# Patient Record
Sex: MALE | Race: WHITE | HISPANIC OR LATINO | URBAN - METROPOLITAN AREA
[De-identification: names, ages, dates, MRNs, and addresses within clinical notes are randomized per-mention and may not be internally consistent; named-entity substitution may affect disease eponyms.]

---

## 2017-07-19 ENCOUNTER — EMERGENCY (EMERGENCY)
Facility: HOSPITAL | Age: 42
LOS: 0 days | Discharge: HOME | End: 2017-07-19
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
## 2017-07-19 DIAGNOSIS — S52.571A OTHER INTRAARTICULAR FRACTURE OF LOWER END OF RIGHT RADIUS, INITIAL ENCOUNTER FOR CLOSED FRACTURE: ICD-10-CM

## 2017-07-19 DIAGNOSIS — M25.531 PAIN IN RIGHT WRIST: ICD-10-CM
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
## 2017-07-19 DIAGNOSIS — Z87.891 PERSONAL HISTORY OF NICOTINE DEPENDENCE: ICD-10-CM

## 2018-06-05 ENCOUNTER — EMERGENCY (EMERGENCY)
Facility: HOSPITAL | Age: 43
LOS: 0 days | Discharge: HOME | End: 2018-06-05
Admitting: PHYSICIAN ASSISTANT

## 2018-06-05 VITALS
SYSTOLIC BLOOD PRESSURE: 129 MMHG | OXYGEN SATURATION: 99 % | TEMPERATURE: 98 F | DIASTOLIC BLOOD PRESSURE: 78 MMHG | HEART RATE: 82 BPM | RESPIRATION RATE: 18 BRPM

## 2018-06-05 VITALS
SYSTOLIC BLOOD PRESSURE: 130 MMHG | DIASTOLIC BLOOD PRESSURE: 79 MMHG | OXYGEN SATURATION: 98 % | HEART RATE: 113 BPM | RESPIRATION RATE: 18 BRPM | TEMPERATURE: 98 F
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
## 2018-06-05 DIAGNOSIS — S99.911A UNSPECIFIED INJURY OF RIGHT ANKLE, INITIAL ENCOUNTER: ICD-10-CM
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
## 2018-06-05 DIAGNOSIS — S82.401A UNSPECIFIED FRACTURE OF SHAFT OF RIGHT FIBULA, INITIAL ENCOUNTER FOR CLOSED FRACTURE: ICD-10-CM

## 2018-06-05 DIAGNOSIS — F17.200 NICOTINE DEPENDENCE, UNSPECIFIED, UNCOMPLICATED: ICD-10-CM

## 2018-06-05 RX ORDER — IBUPROFEN 200 MG
600 TABLET ORAL ONCE
Qty: 0 | Refills: 0 | Status: COMPLETED | OUTPATIENT
Start: 2018-06-05 | End: 2018-06-05

## 2018-06-05 RX ADMIN — Medication 600 MILLIGRAM(S): at 13:28

## 2018-06-05 NOTE — ED PROVIDER NOTE - NS ED ROS FT
Review of Systems    Constitutional: (-) fever  Cardiovascular: (-) chest pain, (-) syncope  Gastrointestinal: (-) vomiting, (-) diarrhea  Musculoskeletal: (-) neck pain, (-) back pain  Integumentary: (-) rash, (-) edema  Neurological: (-) headache

## 2018-06-05 NOTE — ED PROVIDER NOTE - PHYSICAL EXAMINATION
PHYSICAL EXAM:    GENERAL: Alert, appears stated age, well appearing, non-toxic  SKIN: Warm, pink and dry. MMM.   EYE: Normal lids/conjunctiva  ENT: Normal hearing, patent oropharynx   NECK: +supple. No meningismus, or JVD, +Trachea midline. no tenderness/step offs.   Pulm: Bilateral BS, normal resp effort, no wheezes, stridor, or retractions  CV: RRR, no M/R/G, 2+ pulses throughout  Abd: soft, non-tender, non-distended  Mskel: no erythema, cyanosis, edema. +TTP of R lateral malleolus +moderate swelling +contusion. no other TTP. 2+ DP pulses. no fibular head TTP.    Neuro: AAOx3, no sensory deficit, 5/5 strength throughout. normal gait.

## 2018-06-05 NOTE — ED PROVIDER NOTE - OBJECTIVE STATEMENT
43 y/o M with hx of fracture to R ankle in past, no surgical repair presents with R ankle pain x 2 days s/p inversion injury. Denies CP, palpitations, SOB, back pain, abdominal pain, n/v/d, fevers, direct trauma, fall, cough, recent travel, recent illness, sick contacts, urinary symptoms, rash. +swelling +bruising. denies other injuries, decreased ROM, paraesthesias, warmth.

## 2022-06-30 ENCOUNTER — NON-APPOINTMENT (OUTPATIENT)
Age: 47
End: 2022-06-30

## 2022-07-05 PROBLEM — Z00.00 ENCOUNTER FOR PREVENTIVE HEALTH EXAMINATION: Status: ACTIVE | Noted: 2022-07-05

## 2022-07-08 ENCOUNTER — APPOINTMENT (OUTPATIENT)
Dept: ORTHOPEDIC SURGERY | Facility: CLINIC | Age: 47
End: 2022-07-08

## 2022-07-08 VITALS — BODY MASS INDEX: 28.79 KG/M2 | WEIGHT: 190 LBS | HEIGHT: 68 IN

## 2022-07-08 DIAGNOSIS — Z78.9 OTHER SPECIFIED HEALTH STATUS: ICD-10-CM

## 2022-07-08 PROCEDURE — L4361: CPT | Mod: RT

## 2022-07-08 PROCEDURE — 99203 OFFICE O/P NEW LOW 30 MIN: CPT

## 2022-07-08 PROCEDURE — 73590 X-RAY EXAM OF LOWER LEG: CPT | Mod: RT

## 2022-07-08 PROCEDURE — 73610 X-RAY EXAM OF ANKLE: CPT | Mod: RT

## 2022-07-08 PROCEDURE — 99072 ADDL SUPL MATRL&STAF TM PHE: CPT

## 2022-07-08 NOTE — DISCUSSION/SUMMARY
[de-identified] :  discussed x-ray results patient showing degenerative changes, no acute fractures, subluxations or dislocations.  MRI ordered for further evaluation of internal derangement.  Placed patient tall cam walker boot weight-bearing as tolerated.  Discussed further treatment including rest, anti-inflammatories, range-of-motion exercises, physical therapy, warm water Epsom salt soaks.  Call 2-3 days after MRI to review results.  Patient is currently unable to currently work.  Patient is 100% disabled at this time.  Will re-evaluate at follow-up visit 3 weeks with foot/ankle department.  Call if any questions or concerns.  Patient understands agrees with plan.  Patient seen under supervision of Dr. Barger.

## 2022-07-08 NOTE — WORK
[Sprain/Strain] : sprain/strain [Was the competent medical cause of the injury] : was the competent medical cause of the injury [Are consistent with the injury] : are consistent with the injury [Total] : total [Does not reveal pre-existing condition(s) that may affect treatment/prognosis] : does not reveal pre-existing condition(s) that may affect treatment/prognosis [Other: ___] : [unfilled] [Cannot return to work because ________] : cannot return to work because [unfilled] [15+ days] : 15+ days [Patient] : patient [No Rx restrictions] : No Rx restrictions. [I provided the services listed above] :  I provided the services listed above. [FreeTextEntry1] :  good [FreeTextEntry3] : NR

## 2022-07-08 NOTE — HISTORY OF PRESENT ILLNESS
[de-identified] : Patient is a 46-year-old male here for right ankle injury.  Worker's compensation.  Patient states while at work on 06/23/2022 he twisted his right ankle and landed an awkward position.  Patient states he was in immediate pain.  Patient states he went to urgent care on 07/05/2022.  Patient states x-rays were taken and read as negative.  Patient states he has  been having worsening pain, pain when walking, sometimes feels unstable to walk.  Denies numbness and tingling

## 2022-07-08 NOTE — DATA REVIEWED
[Right] : of the right [Ankle] : ankle [FreeTextEntry1] : X-Ray RT ankle/RT tib-fib:Fractures: No acute fractures are seen. There is mild healed deformity of the lateral malleolus. Ossification along the medial malleolus suggests chronic ankle sprains.Small tibiotalar marginal osteophytes are present.

## 2022-07-08 NOTE — PHYSICAL EXAM
[Right] : right foot and ankle [] : negative Cronin test [FreeTextEntry3] :  mild ankle swelling [FreeTextEntry8] :  mild pain to palpation over lateral malleolus/ lateral ligaments / anterior joint line/ anterior aspect of distal tibia [FreeTextEntry9] :  good range of motion throughout with pain to lateral ankle [de-identified] :  good strength throughout

## 2022-07-29 ENCOUNTER — APPOINTMENT (OUTPATIENT)
Dept: ORTHOPEDIC SURGERY | Facility: CLINIC | Age: 47
End: 2022-07-29

## 2022-07-29 VITALS — BODY MASS INDEX: 28.79 KG/M2 | HEIGHT: 68 IN | WEIGHT: 190 LBS

## 2022-07-29 PROCEDURE — 99072 ADDL SUPL MATRL&STAF TM PHE: CPT

## 2022-07-29 PROCEDURE — 99213 OFFICE O/P EST LOW 20 MIN: CPT | Mod: PA

## 2022-08-02 NOTE — PHYSICAL EXAM
[de-identified] :   On examination of his right ankle he has some swelling of the ankle, no erythema, no ecchymosis.  He is tender over the ATFL and CFL.  He is tender over the talar dome.  He is nontender over the medial or lateral malleolus.  He is nontender over the deltoid ligament.  He is nontender over the Achilles, negative Cronin's test, no calf tenderness.  He has no tenderness palpation of the calcaneus.  He has no tenderness palpation of the foot.  He has good range of motion,  slightly decreased, pain with dorsiflexion and plantar flexion.  Sensation is intact throughout, 2+ DP and PT pulses.

## 2022-08-02 NOTE — DISCUSSION/SUMMARY
[de-identified] : At this time I am going to put in another request for the MRI.  I also gave him a script to start doing physical therapy.  He will call me after the MRI to go over the results.  Will schedule him a follow-up for repeat evaluation. Patient will call me if any other problems or concerns.  Patient verbalized understanding and agreed with the plan, all questions were answered in the office today.\par \par Patient was seen under the supervision of Dr. Felix.\par \par   This is a worker's compensation case, the patient is unable to work at this time, he is currently on temporary total disability.

## 2022-08-02 NOTE — WORK
[Sprain/Strain] : sprain/strain [Was the competent medical cause of the injury] : was the competent medical cause of the injury [Are consistent with the injury] : are consistent with the injury [Total] : total [Does not reveal pre-existing condition(s) that may affect treatment/prognosis] : does not reveal pre-existing condition(s) that may affect treatment/prognosis [Other: ___] : [unfilled] [Cannot return to work because ________] : cannot return to work because [unfilled] [15+ days] : 15+ days [Patient] : patient [No Rx restrictions] : No Rx restrictions. [I provided the services listed above] :  I provided the services listed above. [FreeTextEntry1] :  good [FreeTextEntry3] : KM

## 2022-08-02 NOTE — HISTORY OF PRESENT ILLNESS
[de-identified] : Patient is a 46-year-old male here for right ankle injury.  Worker's compensation.  Patient states while at work on 06/23/2022 he twisted his right ankle and landed an awkward position.  Patient states he was in immediate pain.  He was seen in our walk-in and was placed in a tall Cam walker boot for an ankle sprain.  He was given a script for an MRI.  Patient states he never got the MRI of because he never heard anything about the MRI being approved.    He states he is still having a lot of pain, the pain has not improved.

## 2022-09-02 ENCOUNTER — APPOINTMENT (OUTPATIENT)
Dept: ORTHOPEDIC SURGERY | Facility: CLINIC | Age: 47
End: 2022-09-02

## 2022-09-02 VITALS — BODY MASS INDEX: 28.79 KG/M2 | HEIGHT: 68 IN | WEIGHT: 190 LBS

## 2022-09-02 PROCEDURE — 99213 OFFICE O/P EST LOW 20 MIN: CPT | Mod: ACP

## 2022-09-02 PROCEDURE — 99072 ADDL SUPL MATRL&STAF TM PHE: CPT | Mod: ACP

## 2022-09-02 NOTE — DISCUSSION/SUMMARY
[de-identified] :   At this time it seems that he has not completed the wc paperwork and that is why the MRI and therapy was never approved.  We will get the correct paperwork filled out and resubmit the MRI and PT for approval.  I discussed with him to try to wean out of the boot into an ankle support brace.  He will call me once he obtains the MRI to go over the results.  Will schedule another follow-up for repeat evaluation. Patient will call me if any other problems or concerns.  Patient verbalized understanding and agreed with the plan, all questions were answered in the office today.\par \par This is a worker's compensation case, the patient is unable to work at this time, he is currently on temporary total disability.

## 2022-09-02 NOTE — HISTORY OF PRESENT ILLNESS
[de-identified] : Patient is a 46-year-old male here for right ankle injury.  Worker's compensation.  Patient states while at work on 06/23/2022 he twisted his right ankle and landed an awkward position.  Patient states he was in immediate pain.  He was seen in our walk-in and was placed in a tall Cam walker boot for an ankle sprain.  He was given a script for an MRI.  Patient states he never got the MRI of because he never heard anything about the MRI being approved.   MRI was re-requested at his last visit but he states he still never heard anything about any approval so.  He states he is still having the same pain in the ankle.  He also did not start physical therapy with his he states that was not improved either.  He is still wearing the Cam walker boot.  He denies any new injury or trauma.  He denies any standing or any calf pain.

## 2022-09-02 NOTE — PHYSICAL EXAM
[de-identified] :   On examination of his right ankle he has some swelling of the ankle, no erythema, no ecchymosis.  He is tender over the ATFL and CFL.  He is tender over the talar dome.  He is nontender over the medial or lateral malleolus.  He is nontender over the deltoid ligament.  He is nontender over the Achilles, negative Cronin's test, no calf tenderness.  He has no tenderness palpation of the calcaneus.  He has no tenderness palpation of the foot.  He has good range of motion,  slightly decreased, pain with dorsiflexion and plantar flexion.  Sensation is intact throughout, 2+ DP and PT pulses.

## 2022-10-14 ENCOUNTER — APPOINTMENT (OUTPATIENT)
Dept: ORTHOPEDIC SURGERY | Facility: CLINIC | Age: 47
End: 2022-10-14

## 2022-11-01 ENCOUNTER — NON-APPOINTMENT (OUTPATIENT)
Age: 47
End: 2022-11-01

## 2022-11-01 ENCOUNTER — APPOINTMENT (OUTPATIENT)
Dept: ORTHOPEDIC SURGERY | Facility: CLINIC | Age: 47
End: 2022-11-01

## 2022-11-01 PROCEDURE — 99213 OFFICE O/P EST LOW 20 MIN: CPT

## 2022-11-01 PROCEDURE — 99072 ADDL SUPL MATRL&STAF TM PHE: CPT

## 2022-11-01 NOTE — PHYSICAL EXAM
[de-identified] :   On examination of his right ankle he has some swelling of the ankle, no erythema, no ecchymosis.  He is tender over the ATFL and CFL.  He is tender over the talar dome.  He is nontender over the medial or lateral malleolus.  He is nontender over the deltoid ligament.  He is nontender over the Achilles, negative Cronin's test, no calf tenderness.  He has no tenderness palpation of the calcaneus.  He has no tenderness palpation of the foot.  He has good range of motion,  slightly decreased, pain with dorsiflexion and plantar flexion.  Sensation is intact throughout, 2+ DP and PT pulses.

## 2022-11-01 NOTE — REASON FOR VISIT
[FreeTextEntry2] : follow up for work injury of 6/23/22 to his right ankle  still in the Cam boot still some pain swelling limping has not been granted therapy or received approval for an MRI

## 2022-11-01 NOTE — HISTORY OF PRESENT ILLNESS
[de-identified] : Patient is a 47-year-old male here for right ankle injury.  Worker's compensation.  Patient states while at work on 06/23/2022 he twisted his right ankle and landed an awkward position.  Patient states he was in immediate pain.  He was seen in our walk-in and was placed in a tall Cam walker boot for an ankle sprain.  He was given a script for an MRI.  Patient states he never got the MRI of because he never heard anything about the MRI being approved.   MRI was re-requested at his last visit but he states he still never heard anything about any approval so.  He states he is still having the same pain in the ankle.  He also did not start physical therapy with his he states that was not improved either.  He is still wearing the Cam walker boot.  He denies any new injury or trauma.  He denies any standing or any calf pain.

## 2022-11-01 NOTE — DISCUSSION/SUMMARY
[de-identified] :  at this time he has as a physical job does a lot of standing for 10 hours at this time not ready to return to work asked him to start weaning out of the Cam boot warm soaks Advil please put other request in for physical therapy for the ankle and an MRI of the ankle he is totally disabled unable to work I will see him back in about 6 weeks

## 2022-11-14 ENCOUNTER — FORM ENCOUNTER (OUTPATIENT)
Age: 47
End: 2022-11-14

## 2022-12-08 ENCOUNTER — FORM ENCOUNTER (OUTPATIENT)
Age: 47
End: 2022-12-08

## 2022-12-13 ENCOUNTER — APPOINTMENT (OUTPATIENT)
Dept: ORTHOPEDIC SURGERY | Facility: CLINIC | Age: 47
End: 2022-12-13

## 2022-12-13 PROCEDURE — 99072 ADDL SUPL MATRL&STAF TM PHE: CPT

## 2022-12-13 PROCEDURE — 99213 OFFICE O/P EST LOW 20 MIN: CPT

## 2022-12-13 NOTE — DISCUSSION/SUMMARY
[de-identified] :  at this time he has as a physical job does a lot of standing for 10 hours at this time not ready to return to work asked him to start weaning out of the Cam boot warm soaks Advil please put other request in for physical therapy for the ankle and an MRI of the ankle he is totally disabled unable to work I will see him back in about 6 weeks

## 2022-12-13 NOTE — HISTORY OF PRESENT ILLNESS
[de-identified] : Patient is a 47-year-old male here for right ankle injury.  Worker's compensation.  Patient states while at work on 06/23/2022 he twisted his right ankle and landed an awkward position.  Patient states he was in immediate pain.  He was seen in our walk-in and was placed in a tall Cam walker boot for an ankle sprain.  He was given a script for an MRI.  Patient states he never got the MRI of because he never heard anything about the MRI being approved.   MRI was re-requested at his last visit but he states he still never heard anything about any approval so.  He states he is still having the same pain in the ankle.  He also did not start physical therapy with his he states that was not improved either.  He is still wearing the Cam walker boot.  He denies any new injury or trauma.  He denies any standing or any calf pain.

## 2022-12-13 NOTE — PHYSICAL EXAM
[de-identified] :   On examination of his right ankle he has  mild swelling of the ankle, no erythema, no ecchymosis.  He is tender over the ATFL and CFL.  .  He is nontender over the medial or lateral malleolus.  He is nontender over the deltoid ligament.  He is nontender over the Achilles, negative Cronin's test, no calf tenderness.  He has no tenderness palpation of the calcaneus.  He has no tenderness palpation of the foot.  He has  mild limits in dorsiflexion and plantar flexion range of motion,   pain with dorsiflexion and plantar flexion.  Sensation is intact throughout, 2+ DP and PT pulses.

## 2022-12-13 NOTE — ASSESSMENT
[FreeTextEntry1] :  cleared to return to work with some accommodation no standing more than 3 hours at a time and provided a job location which is much more stationary he had a moderate partial temporary disability can return with accommodation I will see him in a few months

## 2023-02-03 ENCOUNTER — NON-APPOINTMENT (OUTPATIENT)
Age: 48
End: 2023-02-03

## 2023-02-03 ENCOUNTER — APPOINTMENT (OUTPATIENT)
Dept: ORTHOPEDIC SURGERY | Facility: CLINIC | Age: 48
End: 2023-02-03
Payer: OTHER MISCELLANEOUS

## 2023-02-03 PROCEDURE — 99213 OFFICE O/P EST LOW 20 MIN: CPT

## 2023-02-03 PROCEDURE — 99072 ADDL SUPL MATRL&STAF TM PHE: CPT

## 2023-02-03 NOTE — ASSESSMENT
[FreeTextEntry1] :  cleared to return to work  full duty no restrictions he has a moderate partial temporary disability  I will see him in a few months  please were requested for some therapy to the ankle and an MRI of the right ankle

## 2023-02-03 NOTE — PHYSICAL EXAM
[de-identified] :   On examination of his right ankle he has  mild swelling of the ankle, no erythema, no ecchymosis.  He is tender over the ATFL and CFL.  .  He is nontender over the medial or lateral malleolus.  He is nontender over the deltoid ligament.  He is nontender over the Achilles, negative Cronin's test, no calf tenderness.  He has no tenderness palpation of the calcaneus.  He has no tenderness palpation of the foot.  He has  mild limits in dorsiflexion and plantar flexion range of motion,   pain with dorsiflexion and plantar flexion.  Sensation is intact throughout, 2+ DP and PT pulses.

## 2023-02-03 NOTE — HISTORY OF PRESENT ILLNESS
[de-identified] : Patient is a 47-year-old male here for right ankle injury.  Worker's compensation.  Patient states while at work on 06/23/2022 he twisted his right ankle and landed an awkward position.  Patient states he was in immediate pain.  He was seen in our walk-in and was placed in a tall Cam walker boot for an ankle sprain.  He was given a script for an MRI.  Patient states he never got the MRI of because he never heard anything about the MRI being approved.   MRI was re-requested at his last visit but he states he still never heard anything about any approval so.  He states he is still having the same pain in the ankle.  He also did not start physical therapy with his he states that was not improved either.  He is still wearing the Cam walker boot.  He denies any new injury or trauma.  He denies any standing or any calf pain.

## 2023-03-22 ENCOUNTER — NON-APPOINTMENT (OUTPATIENT)
Age: 48
End: 2023-03-22

## 2023-04-26 ENCOUNTER — FORM ENCOUNTER (OUTPATIENT)
Age: 48
End: 2023-04-26

## 2023-05-08 ENCOUNTER — NON-APPOINTMENT (OUTPATIENT)
Age: 48
End: 2023-05-08

## 2023-05-08 ENCOUNTER — APPOINTMENT (OUTPATIENT)
Dept: ORTHOPEDIC SURGERY | Facility: CLINIC | Age: 48
End: 2023-05-08
Payer: OTHER MISCELLANEOUS

## 2023-05-08 DIAGNOSIS — S99.911A UNSPECIFIED INJURY OF RIGHT ANKLE, INITIAL ENCOUNTER: ICD-10-CM

## 2023-05-08 PROCEDURE — 99213 OFFICE O/P EST LOW 20 MIN: CPT

## 2023-05-08 NOTE — REASON FOR VISIT
[FreeTextEntry2] : Patient is coming in for a follow up WC DOI 06/22/2022 of right ankle  working still light duty still has moderate amount of pain in the ankle some swelling still poor endurance standing extended periods of time did get the MRI finally 02/16/2023.

## 2023-05-08 NOTE — PHYSICAL EXAM
[de-identified] :   On examination of his right ankle he has  mild swelling of the ankle, no erythema, no ecchymosis.  He is tender over the ATFL and CFL.  .  He is nontender over the medial or lateral malleolus.  He is nontender over the deltoid ligament.  He is nontender over the Achilles, negative Cronin's test, no calf tenderness.  He has no tenderness palpation of the calcaneus.  He has no tenderness palpation of the foot.  He has  mild limits in dorsiflexion and plantar flexion range of motion,   pain with dorsiflexion and plantar flexion.  Sensation is intact throughout, 2+ DP and PT pulses.\par  MRI right ankle 02/20/2023:  Osteochondral injury Chasity dome tibial full phoned osteochondral injury posterior tibial peroneal tendons swelling and tendinitis

## 2023-05-08 NOTE — HISTORY OF PRESENT ILLNESS
[de-identified] : Patient is a 47-year-old male here for right ankle injury.  Worker's compensation.  Patient states while at work on 06/23/2022 he twisted his right ankle and landed an awkward position.  Patient states he was in immediate pain.  He was seen in our walk-in and was placed in a tall Cam walker boot for an ankle sprain.  He was given a script for an MRI.  Patient states he never got the MRI of because he never heard anything about the MRI being approved.   MRI was re-requested at his last visit but he states he still never heard anything about any approval so.  He states he is still having the same pain in the ankle.  He also did not start physical therapy with his he states that was not improved either.  He is still wearing the Cam walker boot.  He denies any new injury or trauma.  He denies any standing or any calf pain.

## 2023-07-17 ENCOUNTER — NON-APPOINTMENT (OUTPATIENT)
Age: 48
End: 2023-07-17

## 2023-08-07 ENCOUNTER — APPOINTMENT (OUTPATIENT)
Dept: ORTHOPEDIC SURGERY | Facility: CLINIC | Age: 48
End: 2023-08-07
Payer: OTHER MISCELLANEOUS

## 2023-08-07 PROCEDURE — 99213 OFFICE O/P EST LOW 20 MIN: CPT

## 2023-08-07 NOTE — ASSESSMENT
[FreeTextEntry1] :   may continue to work full duty  he has a mild partial temporary disability  I will see him in a few months  please request   more   physical therapy

## 2023-08-07 NOTE — PHYSICAL EXAM
[de-identified] :   On examination of his right ankle he has  mild swelling of the ankle, no erythema, no ecchymosis.  He is tender over the ATFL and CFL.  .  He is nontender over the medial or lateral malleolus.  He is nontender over the deltoid ligament.  He is nontender over the Achilles, negative Cronin's test, no calf tenderness.  He has no tenderness palpation of the calcaneus.  He has no tenderness palpation of the foot.  He has  mild limits in dorsiflexion and plantar flexion range of motion,   pain with dorsiflexion and plantar flexion.  Sensation is intact throughout, 2+ DP and PT pulses.\par   MRI right ankle 02/20/2023:  Osteochondral injury Chasity dome tibial full phoned osteochondral injury posterior tibial peroneal tendons swelling and tendinitis

## 2023-08-07 NOTE — REASON FOR VISIT
[FreeTextEntry2] : Patient is coming in for follow up on  DOI 06/22/2022 of the right ankle.  He is feeling a little better did go back to full duty last week not on any medication not doing any therapy

## 2023-08-07 NOTE — HISTORY OF PRESENT ILLNESS
[de-identified] : Patient is a 47-year-old male here for right ankle injury.  Worker's compensation.  Patient states while at work on 06/23/2022 he twisted his right ankle and landed an awkward position.  Patient states he was in immediate pain.  He was seen in our walk-in and was placed in a tall Cam walker boot for an ankle sprain.  He was given a script for an MRI.  Patient states he never got the MRI of because he never heard anything about the MRI being approved.   MRI was re-requested at his last visit but he states he still never heard anything about any approval so.  He states he is still having the same pain in the ankle.  He also did not start physical therapy with his he states that was not improved either.  He is still wearing the Cam walker boot.  He denies any new injury or trauma.  He denies any standing or any calf pain.

## 2023-08-30 ENCOUNTER — NON-APPOINTMENT (OUTPATIENT)
Age: 48
End: 2023-08-30

## 2023-11-09 ENCOUNTER — APPOINTMENT (OUTPATIENT)
Dept: ORTHOPEDIC SURGERY | Facility: CLINIC | Age: 48
End: 2023-11-09
Payer: OTHER MISCELLANEOUS

## 2023-11-09 DIAGNOSIS — S93.401D SPRAIN OF UNSPECIFIED LIGAMENT OF RIGHT ANKLE, SUBSEQUENT ENCOUNTER: ICD-10-CM

## 2023-11-09 PROCEDURE — 99213 OFFICE O/P EST LOW 20 MIN: CPT
